# Patient Record
Sex: MALE | Race: WHITE | NOT HISPANIC OR LATINO | URBAN - METROPOLITAN AREA
[De-identification: names, ages, dates, MRNs, and addresses within clinical notes are randomized per-mention and may not be internally consistent; named-entity substitution may affect disease eponyms.]

---

## 2020-12-12 ENCOUNTER — INPATIENT (INPATIENT)
Facility: HOSPITAL | Age: 55
LOS: 1 days | Discharge: HOME | End: 2020-12-14
Attending: INTERNAL MEDICINE | Admitting: INTERNAL MEDICINE
Payer: COMMERCIAL

## 2020-12-12 VITALS
TEMPERATURE: 99 F | HEART RATE: 76 BPM | DIASTOLIC BLOOD PRESSURE: 76 MMHG | SYSTOLIC BLOOD PRESSURE: 135 MMHG | HEIGHT: 65 IN | RESPIRATION RATE: 16 BRPM | WEIGHT: 179.9 LBS | OXYGEN SATURATION: 98 %

## 2020-12-12 DIAGNOSIS — Z96.642 PRESENCE OF LEFT ARTIFICIAL HIP JOINT: Chronic | ICD-10-CM

## 2020-12-12 DIAGNOSIS — Z86.69 PERSONAL HISTORY OF OTHER DISEASES OF THE NERVOUS SYSTEM AND SENSE ORGANS: Chronic | ICD-10-CM

## 2020-12-12 DIAGNOSIS — Z98.89 OTHER SPECIFIED POSTPROCEDURAL STATES: Chronic | ICD-10-CM

## 2020-12-12 LAB
A1C WITH ESTIMATED AVERAGE GLUCOSE RESULT: 5.3 % — SIGNIFICANT CHANGE UP (ref 4–5.6)
ALBUMIN SERPL ELPH-MCNC: 4 G/DL — SIGNIFICANT CHANGE UP (ref 3.3–5)
ALP SERPL-CCNC: 56 U/L — SIGNIFICANT CHANGE UP (ref 40–120)
ALT FLD-CCNC: 15 U/L — SIGNIFICANT CHANGE UP (ref 4–41)
ANION GAP SERPL CALC-SCNC: 8 MMOL/L — SIGNIFICANT CHANGE UP (ref 7–14)
AST SERPL-CCNC: 17 U/L — SIGNIFICANT CHANGE UP (ref 4–40)
BASOPHILS # BLD AUTO: 0.05 K/UL — SIGNIFICANT CHANGE UP (ref 0–0.2)
BASOPHILS NFR BLD AUTO: 0.9 % — SIGNIFICANT CHANGE UP (ref 0–2)
BILIRUB SERPL-MCNC: 0.2 MG/DL — SIGNIFICANT CHANGE UP (ref 0.2–1.2)
BUN SERPL-MCNC: 18 MG/DL — SIGNIFICANT CHANGE UP (ref 7–23)
CALCIUM SERPL-MCNC: 8.8 MG/DL — SIGNIFICANT CHANGE UP (ref 8.4–10.5)
CHLORIDE SERPL-SCNC: 107 MMOL/L — SIGNIFICANT CHANGE UP (ref 98–107)
CO2 SERPL-SCNC: 26 MMOL/L — SIGNIFICANT CHANGE UP (ref 22–31)
CREAT SERPL-MCNC: 0.69 MG/DL — SIGNIFICANT CHANGE UP (ref 0.5–1.3)
EOSINOPHIL # BLD AUTO: 0.31 K/UL — SIGNIFICANT CHANGE UP (ref 0–0.5)
EOSINOPHIL NFR BLD AUTO: 5.3 % — SIGNIFICANT CHANGE UP (ref 0–6)
ESTIMATED AVERAGE GLUCOSE: 105 MG/DL — SIGNIFICANT CHANGE UP (ref 68–114)
GLUCOSE SERPL-MCNC: 88 MG/DL — SIGNIFICANT CHANGE UP (ref 70–99)
HCT VFR BLD CALC: 42.6 % — SIGNIFICANT CHANGE UP (ref 39–50)
HGB BLD-MCNC: 13.7 G/DL — SIGNIFICANT CHANGE UP (ref 13–17)
IANC: 3.13 K/UL — SIGNIFICANT CHANGE UP (ref 1.5–8.5)
IMM GRANULOCYTES NFR BLD AUTO: 0.3 % — SIGNIFICANT CHANGE UP (ref 0–1.5)
LYMPHOCYTES # BLD AUTO: 1.85 K/UL — SIGNIFICANT CHANGE UP (ref 1–3.3)
LYMPHOCYTES # BLD AUTO: 31.7 % — SIGNIFICANT CHANGE UP (ref 13–44)
MCHC RBC-ENTMCNC: 31.1 PG — SIGNIFICANT CHANGE UP (ref 27–34)
MCHC RBC-ENTMCNC: 32.2 GM/DL — SIGNIFICANT CHANGE UP (ref 32–36)
MCV RBC AUTO: 96.8 FL — SIGNIFICANT CHANGE UP (ref 80–100)
MONOCYTES # BLD AUTO: 0.47 K/UL — SIGNIFICANT CHANGE UP (ref 0–0.9)
MONOCYTES NFR BLD AUTO: 8.1 % — SIGNIFICANT CHANGE UP (ref 2–14)
NEUTROPHILS # BLD AUTO: 3.13 K/UL — SIGNIFICANT CHANGE UP (ref 1.8–7.4)
NEUTROPHILS NFR BLD AUTO: 53.7 % — SIGNIFICANT CHANGE UP (ref 43–77)
NRBC # BLD: 0 /100 WBCS — SIGNIFICANT CHANGE UP
NRBC # FLD: 0 K/UL — SIGNIFICANT CHANGE UP
PLATELET # BLD AUTO: 199 K/UL — SIGNIFICANT CHANGE UP (ref 150–400)
POTASSIUM SERPL-MCNC: 4.1 MMOL/L — SIGNIFICANT CHANGE UP (ref 3.5–5.3)
POTASSIUM SERPL-SCNC: 4.1 MMOL/L — SIGNIFICANT CHANGE UP (ref 3.5–5.3)
PROT SERPL-MCNC: 6.6 G/DL — SIGNIFICANT CHANGE UP (ref 6–8.3)
RBC # BLD: 4.4 M/UL — SIGNIFICANT CHANGE UP (ref 4.2–5.8)
RBC # FLD: 13.1 % — SIGNIFICANT CHANGE UP (ref 10.3–14.5)
SODIUM SERPL-SCNC: 141 MMOL/L — SIGNIFICANT CHANGE UP (ref 135–145)
TROPONIN T, HIGH SENSITIVITY RESULT: 8 NG/L — SIGNIFICANT CHANGE UP
WBC # BLD: 5.83 K/UL — SIGNIFICANT CHANGE UP (ref 3.8–10.5)
WBC # FLD AUTO: 5.83 K/UL — SIGNIFICANT CHANGE UP (ref 3.8–10.5)

## 2020-12-12 PROCEDURE — 93010 ELECTROCARDIOGRAM REPORT: CPT

## 2020-12-12 PROCEDURE — 71046 X-RAY EXAM CHEST 2 VIEWS: CPT | Mod: 26

## 2020-12-12 PROCEDURE — 99220: CPT

## 2020-12-12 NOTE — ED CDU PROVIDER INITIAL DAY NOTE - OBJECTIVE STATEMENT
54yo M no sig pmhx pw chest pain x one day. Started earlier today after lifting water bottles. pain located on the left chest, non radiating, exertional, non pleuritic. Resolved, took a nap, went to walk the dog felt pain again came to ED. Currently denies chest pain. no associated n/v/palpitations. +smoker +family hx of CAD (brother) Pt never had  a prior stress or echo test. Works out regularly never had pain like this. Took an aspirin earlier    CDU PETE Bender: Agree with above. 54yo M no sig pmhx pw chest pain x one day. Started earlier today after lifting water bottles. pain located on the left chest, non radiating, exertional, non pleuritic. Resolved, took a nap, went to walk the dog felt pain again came to ED. Currently denies chest pain. no associated n/v/palpitations. +smoker +family hx of CAD (brother) Pt never had  a prior stress or echo test. Works out regularly never had pain like this. Took an aspirin earlier    CDU PETE Bender: Agree with above. 54yo M no sig pmhx pw chest pain x one day. Started earlier today after lifting water bottles. pain located on the left chest, non radiating, exertional, non pleuritic. Pt states he was lifting water bottles earlier that day. States the pain will be 6/10, states it lasts for around 6 minutes. Pt states it has recurred multiple times. Pt states he does not have any pain at the moment. Pt states he does not have a Cardiologist. Pt denies any other sx or acute complaints. Plan is CDU for overnight telemtry, AM stress test.

## 2020-12-12 NOTE — ED CDU PROVIDER INITIAL DAY NOTE - ATTENDING CONTRIBUTION TO CARE
Attending Statement: I have reviewed and agree with all pertinent clinical information, including history and physical exam and agree with treatment plan of the PA, except as noted.  56yo M no sig pmhx pw chest pain x one day. Started earlier today after lifting water bottles. pain located on the left chest, non radiating, exertional, non pleuritic. Resolved, took a nap, went to walk the dog felt pain again came to ED. Currently denies chest pain. no associated n/v/palpitations. +smoker +family hx of CAD (brother) Pt never had  a prior stress or echo test. Works out regularly never had pain like this. Took an aspirin earlier  Vital signs noted. mmm. normal S1-S2 No resp distress. able to speak in full and clear sentences. no wheeze, rales or stridor. soft nontender abdomen. no  rebound. no guarding. no sign of trauma. no CVAT no pedal edema. no calf tenderness. normal pulses bilateral feet.  plan tele monitor, stress and echo in am

## 2020-12-12 NOTE — ED CDU PROVIDER INITIAL DAY NOTE - MEDICAL DECISION MAKING DETAILS
56yo M no sig pmhx pw chest pain x one day. Started earlier today after lifting water bottles. pain located on the left chest, non radiating, exertional, non pleuritic. Resolved, took a nap, went to walk the dog felt pain again came to ED. EKG is normal, trop is 8 and 8. Plan is overnight telemetry and an AM stress test. Pt states he has never seen a cardiologist or had testing before. Pt's brother has a H/O MI.

## 2020-12-12 NOTE — ED ADULT TRIAGE NOTE - CHIEF COMPLAINT QUOTE
c/o intermittent non-radiating sharp pains to left upper chest since this afternoon. Denies any cough, fever/chills, N/V, dizziness or SOB. Hx Bilateral hip replacements. c/o intermittent non-radiating sharp pains to left upper chest since this afternoon while sitting. Pt does recall lifting 2 cases of water today but states he actively exercises daily. Denies any cough, fever/chills, N/V, dizziness or SOB. Hx Bilateral hip replacements.

## 2020-12-12 NOTE — ED ADULT NURSE NOTE - CHIEF COMPLAINT QUOTE
c/o intermittent non-radiating sharp pains to left upper chest since this afternoon while sitting. Pt does recall lifting 2 cases of water today but states he actively exercises daily. Denies any cough, fever/chills, N/V, dizziness or SOB. Hx Bilateral hip replacements.

## 2020-12-12 NOTE — ED ADULT NURSE NOTE - NSIMPLEMENTINTERV_GEN_ALL_ED
Implemented All Universal Safety Interventions:  Dresher to call system. Call bell, personal items and telephone within reach. Instruct patient to call for assistance. Room bathroom lighting operational. Non-slip footwear when patient is off stretcher. Physically safe environment: no spills, clutter or unnecessary equipment. Stretcher in lowest position, wheels locked, appropriate side rails in place.

## 2020-12-12 NOTE — ED PROVIDER NOTE - OBJECTIVE STATEMENT
Dr Pardo  54yo M no sig pmhx pw chest pain x one day. Started earlier today after lifting water bottles. pain located on the left chest, non radiating, exertional, non pleuritic. Resolved, took a nap, went to walk the dog felt pain again came to ED. Currently denies chest pain. no associated n/v/palpitations. +smoker +family hx of CAD (brother) Pt never had  a prior stress or echo test. Works out regularly never had pain like this. Took an aspirin earlier

## 2020-12-12 NOTE — ED PROVIDER NOTE - CLINICAL SUMMARY MEDICAL DECISION MAKING FREE TEXT BOX
plan ekg, labs, cxr, covid,cdu plan ekg, labs, cxr, covid,cdu    PETE Bender: Agree with above, 54 Y/O M denies PMH C/O CP x multiple episodes at rest. Pt endorses active lifestyle but has never had a cardiac workup and brother had a H.O MI. EKG NSR. WIll CDU for stress and overnight telemetry.

## 2020-12-12 NOTE — ED PROVIDER NOTE - PHYSICAL EXAMINATION
Dr Pardo  nontoxic male. mmm  normal S1-S2  No resp distress. able to speak in full and clear sentences. no wheeze, rales or stridor.  soft nontender abdomen. no  rebound. no guarding. no sign of trauma. no CVAT   ambulatory no distress

## 2020-12-12 NOTE — ED ADULT NURSE NOTE - OBJECTIVE STATEMENT
55 yr old male pt presents to ED for evaluation of chest pain. pt states his pain started x today after lifting cases of water while shopping. pt describes exertional non radiating pain to left chest whish resolved shortly after onset. pt axox4, in nad, steady gait, denies any current cp, sob, n/v/d headache, dizziness. pt evaluated by provider, labwork collected, iv placed 20 g to left ac, awaiting lab results and disposition.

## 2020-12-12 NOTE — ED PROVIDER NOTE - FAMILY HISTORY
Mother  Still living? Yes, Estimated age: Age Unknown  Family history of bladder cancer, Age at diagnosis: Age Unknown     Sibling  Still living? Yes, Estimated age: Age Unknown  Family history of breast cancer, Age at diagnosis: Age Unknown  Family history of MI (myocardial infarction), Age at diagnosis: Age Unknown

## 2020-12-12 NOTE — ED PROVIDER NOTE - ATTENDING CONTRIBUTION TO CARE
Attending Statement: I have reviewed and agree with all pertinent clinical information, including history and physical exam and agree with treatment plan of the PA, except as noted.  see HPI/pe

## 2020-12-13 DIAGNOSIS — R94.39 ABNORMAL RESULT OF OTHER CARDIOVASCULAR FUNCTION STUDY: ICD-10-CM

## 2020-12-13 LAB
SARS-COV-2 RNA SPEC QL NAA+PROBE: SIGNIFICANT CHANGE UP
TROPONIN T, HIGH SENSITIVITY RESULT: 8 NG/L — SIGNIFICANT CHANGE UP

## 2020-12-13 PROCEDURE — 99223 1ST HOSP IP/OBS HIGH 75: CPT

## 2020-12-13 PROCEDURE — 78452 HT MUSCLE IMAGE SPECT MULT: CPT | Mod: 26

## 2020-12-13 PROCEDURE — 99217: CPT

## 2020-12-13 PROCEDURE — 93018 CV STRESS TEST I&R ONLY: CPT | Mod: GC

## 2020-12-13 PROCEDURE — 93016 CV STRESS TEST SUPVJ ONLY: CPT | Mod: GC

## 2020-12-13 RX ORDER — ASPIRIN/CALCIUM CARB/MAGNESIUM 324 MG
162 TABLET ORAL ONCE
Refills: 0 | Status: COMPLETED | OUTPATIENT
Start: 2020-12-13 | End: 2020-12-14

## 2020-12-13 NOTE — H&P ADULT - NSHPREVIEWOFSYSTEMS_GEN_ALL_CORE
Review of Systems:   CONSTITUTIONAL: No fever, weight loss, or fatigue  EYES: No eye pain, visual disturbances, or discharge  ENMT:  No difficulty hearing, tinnitus, vertigo; No sinus or throat pain  NECK: No pain or stiffness  RESPIRATORY: No cough, wheezing, chills or hemoptysis; No shortness of breath  CARDIOVASCULAR: No active chest pain, palpitations, dizziness, or leg swelling  GASTROINTESTINAL: No abdominal or epigastric pain. No nausea, vomiting, or hematemesis; No diarrhea or constipation. No melena or hematochezia.  GENITOURINARY: No dysuria, frequency, hematuria, or incontinence  NEUROLOGICAL: No headaches, memory loss, loss of strength, numbness, or tremors  SKIN: No itching, burning, rashes, or lesions   LYMPH NODES: No enlarged glands  ENDOCRINE: No heat or cold intolerance; No hair loss  MUSCULOSKELETAL: No joint pain or swelling; No muscle, back, or extremity pain

## 2020-12-13 NOTE — CONSULT NOTE ADULT - SUBJECTIVE AND OBJECTIVE BOX
DATE OF SERVICE: 12/13/2020   Patient was seen,examined and evaluated  by me.    CHIEF COMPLAINT:Chest Pain    HPI: 56yo M no sig pmhx pw chest pain x one day after lifting water bottles. pain located on the left chest, non radiating, exertional, non pleuritic  Works out regularly never had pain like this.      PAST MEDICAL & SURGICAL HISTORY:  OA (osteoarthritis)    H/O colonoscopy    History of cataract  bilat 2003    Status post total hip replacement, left  2007        MEDICATIONS  (STANDING):    MEDICATIONS  (PRN):      FAMILY HISTORY:  Family history of MI (myocardial infarction) (Sibling)  Brother    Family history of breast cancer (Sibling)    Family history of bladder cancer (Mother)      No family history of premature coronary artery disease or sudden cardiac death    SOCIAL HISTORY:  Smoking-[x ] Active  [ ] Former [ ] Non Smoker  Alcohol-[ ] Denies [x ] Social [ ] Daily  Ilicit Drug use-[x ] Denies [ ] Active user    REVIEW OF SYSTEMS:  Constitutional: [ ] fever, [ ]weight loss, [ ]fatigue   Activity [ ] Bedbound,[ ] Ambulates [ ] Unassisted[ ] Cane/Walker [ ] Assistence.  Effort tolerance:[ ] Excellent [ ] Good [ ] Fair [ ] Poor [ ]  Eyes: [ ] visual changes  Respiratory: [ ]shortness of breath;  [ ] cough, [ ]wheezing, [ ]chills, [ ]hemoptysis  Cardiovascular: [x ] chest pain, [ ]palpitations, [ ]dizziness,  [ ]leg swelling[ ]orthopnea [ ]PND  Gastrointestinal: [ ] abdominal pain, [ ]nausea, [ ]vomiting,  [ ]diarrhea,[ ]constipation  Genitourinary: [ ] dysuria, [ ] hematuria  Neurologic: [ ] headaches [ ] tremors[ ] weakness  Skin: [ ] itching, [ ]burning, [ ] rashes  Endocrine: [ ] heat or cold intolerance  Musculoskeletal: [ ] joint pain or swelling; [ ] muscle, back, or extremity pain  Psychiatric: [ ] depression, [ ]anxiety, [ ]mood swings, or [ ]difficulty sleeping  Hematologic: [ ] easy bruising, [ ] bleeding gums       [ x] All others negative	  [ ] Unable to obtain    Vital Signs Last 24 Hrs  T(C): 36.9 (13 Dec 2020 05:48), Max: 37.1 (12 Dec 2020 19:00)  T(F): 98.4 (13 Dec 2020 05:48), Max: 98.7 (12 Dec 2020 19:00)  HR: 65 (13 Dec 2020 05:48) (60 - 76)  BP: 130/87 (13 Dec 2020 05:48) (130/87 - 145/91)  RR: 16 (13 Dec 2020 05:48) (16 - 16)  SpO2: 98% (13 Dec 2020 05:48) (98% - 100%)  I&O's Summary      PHYSICAL EXAM:  General: No acute distress BMI-31  HEENT: EOMI, PERRL[ ] Icteric  Neck: Supple, No JVD  Lungs: Equal air entry bilaterally; [ ] Rales [ ] Rhonchi [ ] Wheezing  Heart: Regular rate and rhythm;[x ] Murmurs-  2 /6 [x ] Systolic [ ] Diastolic [ ] Radiation,No rubs, or gallops  Abdomen: Nontender, bowel sounds present  Extremities: No clubbing, cyanosis, or edema[ ] Calf tenderness  Nervous system:  Alert & Oriented X3, no focal deficits  Psychiatric: Normal affect  Skin: No rashes or lesions      LABS:  12-12    141  |  107  |  18  ----------------------------<  88  4.1   |  26  |  0.69    Ca    8.8      12 Dec 2020 22:38    TPro  6.6  /  Alb  4.0  /  TBili  0.2  /  DBili  x   /  AST  17  /  ALT  15  /  AlkPhos  56  12-12    Creatinine Trend: 0.69<--                        13.7   5.83  )-----------( 199      ( 12 Dec 2020 22:38 )             42.6     Troponin T, High Sensitivity Result: 8 <--8 ng/L      RADIOLOGY:  XR CHEST PA LAT 2V    FINDINGS:  Clear lungs.  The heart is normal in size.  The bones demonstrate no acute abnormality.    IMPRESSION:  Clear lungs.      ECG [my interpretation]:Sinus rhythm at 76 BPM Normal intervals no acute ST T wave abnormalities    TELEMETRY:Sinus rhythm    STRESS TEST:Pending

## 2020-12-13 NOTE — H&P ADULT - NSICDXFAMILYHX_GEN_ALL_CORE_FT
FAMILY HISTORY:  Mother  Still living? Yes, Estimated age: Age Unknown  Family history of bladder cancer, Age at diagnosis: Age Unknown    Sibling  Still living? Yes, Estimated age: Age Unknown  Family history of breast cancer, Age at diagnosis: Age Unknown  Family history of MI (myocardial infarction), Age at diagnosis: Age Unknown

## 2020-12-13 NOTE — CONSULT NOTE ADULT - ASSESSMENT
54yo M no sig pmhx pw chest pain x one day. Family Hx CAD active smoker no evidence for cardiac injury    #CHEST PAIN concern for ACS  -No evidence for recent cardiac injury  -Troponins negative  -Had Nuclear Stress Test-if non ischemic stable to discharge    Active smoker adviced smoking cessation offered Nicotine patch

## 2020-12-13 NOTE — H&P ADULT - NSHPPHYSICALEXAM_GEN_ALL_CORE
PHYSICAL EXAM:      Constitutional: NAD, well-groomed, well-developed  HEENT:  EOMI, Normal Hearing  Neck: No LAD, No JVD  Back: Normal spine flexure, No CVA tenderness  Respiratory: CTAB  Cardiovascular: S1 and S2, RRR, no M/G/R  Gastrointestinal: BS+, soft, NT/ND  Extremities: No peripheral edema  Vascular: 2+ peripheral pulses  Neurological: A/O x 3, no focal deficits  Psychiatric: Normal mood, normal affect  Musculoskeletal: 5/5 strength b/l upper and lower extremities  Skin: No rashes

## 2020-12-13 NOTE — ED CDU PROVIDER SUBSEQUENT DAY NOTE - ATTENDING CONTRIBUTION TO CARE
evaluated patient on am rounds  No complaints, feeling well, no cp at this time, seen by cardiology this am.   on exam  A & O x 3, NAD, HEENT WNL and no facial asymmetry; lungs CTAB, heart with reg rhythm without murmur; abdomen soft NTND; extremities with no edema; neuro exam non focal with no motor or sensory deficits.  plan to continue tele monitoring, f/u stress results, if ok likely dc.

## 2020-12-13 NOTE — H&P ADULT - HISTORY OF PRESENT ILLNESS
54yo M no sig pmhx p/w chest pain x one day after lifting water bottles. pain located on the left chest, non radiating, exertional, non pleuritic  Works out regularly never had pain like this. ekg, HS trop unremarkable, but NST demonstrated moderate to severe  defect at inferior wall; ef 45%

## 2020-12-13 NOTE — ED CDU PROVIDER DISPOSITION NOTE - ATTENDING CONTRIBUTION TO CARE
56yo M denies PMH former smoker C/O chest pain x one day. Pain was intermittent, L sided and occurred in multiple episodes. Pt had normal enzymes and a normal EKG. Pt was sent to CDU for telemetry monitoring and a stress test. Pt was found to have an abnormal stress, spoke with Dr. Schaefer, planning to admit for AM Angiogram. Pt has no CP, Heparin or other A/C aside from ASA not needed at this time as per Dr. Schaefer.    Gen: Well appearing in NAD  Head: NC/AT  Neck: trachea midline  Resp:  No distress  Ext: no deformities  Neuro:  A&O appears non focal  Skin:  Warm and dry as visualized  Psych:  Normal affect and mood

## 2020-12-13 NOTE — H&P ADULT - NSHPLABSRESULTS_GEN_ALL_CORE
13.7   5.83  )-----------( 199      ( 12 Dec 2020 22:38 )             42.6     12-12    141  |  107  |  18  ----------------------------<  88  4.1   |  26  |  0.69    Ca    8.8      12 Dec 2020 22:38    TPro  6.6  /  Alb  4.0  /  TBili  0.2  /  DBili  x   /  AST  17  /  ALT  15  /  AlkPhos  56  12-12    CAPILLARY BLOOD GLUCOSE            Vital Signs Last 24 Hrs  T(C): 36.4 (14 Dec 2020 02:35), Max: 36.9 (13 Dec 2020 05:48)  T(F): 97.5 (14 Dec 2020 02:35), Max: 98.4 (13 Dec 2020 05:48)  HR: 62 (14 Dec 2020 02:35) (61 - 66)  BP: 133/84 (14 Dec 2020 02:35) (113/82 - 144/99)  BP(mean): --  RR: 18 (14 Dec 2020 02:35) (14 - 18)  SpO2: 96% (14 Dec 2020 02:35) (96% - 98%)

## 2020-12-13 NOTE — ED CDU PROVIDER SUBSEQUENT DAY NOTE - PROGRESS NOTE DETAILS
Pt is resting comfortably, no acute complaints/events on telemetry. Received signout on pt, pt reassessed and has no active CP. Received call from MD Castellon, advised stress is indeed abnormal and that prior plan for resting images is not needed. Spoke to Dr. Jigar Schaefer, states only ASA is needed for AC, planning for cath tomorrow. Plan explained to pt who verbalized understanding.

## 2020-12-13 NOTE — ED CDU PROVIDER DISPOSITION NOTE - CLINICAL COURSE
54yo M denies PMH former smoker C/O chest pain x one day. Pain was intermittent, L sided and occurred in multiple episodes. Pt had normal enzymes and a normal EKG. Pt was sent to CDU for telemetry monitoring and a stress test. Pt was found to have an abnormal stress, spoke with Dr. Schaefer, planning to admit for AM Angiogram. Pt has no CP, Heparin or other A/C aside from ASA not needed at this time as per Dr. Schaefer.

## 2020-12-13 NOTE — ED CDU PROVIDER SUBSEQUENT DAY NOTE - HISTORY
55 y.o male smoker presented to the ED with chest pain- sent to cdu for tele, stress. Pt offers no complaints this morning, denies any chest pain, sob. Will continue to monitor on tele.

## 2020-12-13 NOTE — H&P ADULT - NSICDXPASTSURGICALHX_GEN_ALL_CORE_FT
Scribe Attestation (For Scribes USE Only)... PAST SURGICAL HISTORY:  H/O colonoscopy     History of cataract bilat 2003    Status post total hip replacement, left 2007     Attending Attestation (For Attendings USE Only).../Scribe Attestation (For Scribes USE Only)...

## 2020-12-13 NOTE — CONSULT NOTE ADULT - ATTENDING COMMENTS
Patient was seen and examined by me on 12/13/2020,interim events noted,labs and radiology studies reviewed.  Jigar Schaefer MD,FACC.  [address] [place] [address].  [id number].  [phone]

## 2020-12-14 ENCOUNTER — TRANSCRIPTION ENCOUNTER (OUTPATIENT)
Age: 55
End: 2020-12-14

## 2020-12-14 VITALS
HEART RATE: 64 BPM | DIASTOLIC BLOOD PRESSURE: 83 MMHG | SYSTOLIC BLOOD PRESSURE: 135 MMHG | RESPIRATION RATE: 18 BRPM | OXYGEN SATURATION: 98 % | TEMPERATURE: 98 F

## 2020-12-14 DIAGNOSIS — R94.39 ABNORMAL RESULT OF OTHER CARDIOVASCULAR FUNCTION STUDY: ICD-10-CM

## 2020-12-14 LAB
APTT BLD: 31.9 SEC — SIGNIFICANT CHANGE UP (ref 27–36.3)
CHOLEST SERPL-MCNC: 163 MG/DL — SIGNIFICANT CHANGE UP
HDLC SERPL-MCNC: 53 MG/DL — SIGNIFICANT CHANGE UP
INR BLD: 1.05 RATIO — SIGNIFICANT CHANGE UP (ref 0.88–1.17)
LIPID PNL WITH DIRECT LDL SERPL: 95 MG/DL — SIGNIFICANT CHANGE UP
NON HDL CHOLESTEROL: 110 MG/DL — SIGNIFICANT CHANGE UP
PROTHROM AB SERPL-ACNC: 12.1 SEC — SIGNIFICANT CHANGE UP (ref 9.8–13.1)
TRIGL SERPL-MCNC: 74 MG/DL — SIGNIFICANT CHANGE UP

## 2020-12-14 PROCEDURE — 99152 MOD SED SAME PHYS/QHP 5/>YRS: CPT

## 2020-12-14 PROCEDURE — 93454 CORONARY ARTERY ANGIO S&I: CPT | Mod: 26

## 2020-12-14 RX ORDER — HEPARIN SODIUM 5000 [USP'U]/ML
5000 INJECTION INTRAVENOUS; SUBCUTANEOUS EVERY 8 HOURS
Refills: 0 | Status: DISCONTINUED | OUTPATIENT
Start: 2020-12-14 | End: 2020-12-14

## 2020-12-14 RX ORDER — INFLUENZA VIRUS VACCINE 15; 15; 15; 15 UG/.5ML; UG/.5ML; UG/.5ML; UG/.5ML
0.5 SUSPENSION INTRAMUSCULAR ONCE
Refills: 0 | Status: DISCONTINUED | OUTPATIENT
Start: 2020-12-14 | End: 2020-12-14

## 2020-12-14 RX ADMIN — HEPARIN SODIUM 5000 UNIT(S): 5000 INJECTION INTRAVENOUS; SUBCUTANEOUS at 06:08

## 2020-12-14 RX ADMIN — Medication 162 MILLIGRAM(S): at 08:05

## 2020-12-14 NOTE — DISCHARGE NOTE PROVIDER - CARE PROVIDER_API CALL
Jigar Schaefer)  Cardiology  [address] [id number]  Phone: [phone]  Fax: [phone]  Follow Up Time:

## 2020-12-14 NOTE — PROGRESS NOTE ADULT - ATTENDING COMMENTS
Patient was seen and examined by me on 12/14/2020,interim events noted,labs and radiology studies reviewed.  Jigar Schaefer MD,FACC.  [address] [place] [address].  [id number].  [phone]

## 2020-12-14 NOTE — PROGRESS NOTE ADULT - SUBJECTIVE AND OBJECTIVE BOX
DATE OF SERVICE:12/14/2020  Patient was seen and examined ,interim events noted.Consultant notes ,Labs,Telemetry reviewed by me    PRESENTING CC:Chest pain    HPI and HOSPITAL COURSE: HPI:  56yo M no sig pmhx p/w chest pain x one day after lifting water bottles. pain located on the left chest, non radiating, exertional, non pleuritic  Works out regularly never had pain like this. ekg, HS trop unremarkable, but NST demonstrated moderate to severe  defect at inferior wall; EF 45%       INTERIM EVENTS:Awake alert no chest pain      PMH -reviewed admission note, no change since admission  Heart Failure: Acute [ ] Chronic [ ] Acute on Chronic [ ] Diastolic [ ] Systolic [ ] Combined Systolic and Diastolic[ ]  [place][ ]  ATN[ ]  CKD I [ ] CKDII [ ] CKD III [ ] CKD IV [ ] CKD V [ ] ESRD[ ]  HTN[ ] CVA[ ] DM[ ] COPD[ ] COVID[ ] AF[ ]  PPM[ ] ICD[ ]    MEDICATIONS  (STANDING):  aspirin enteric coated 162 milliGRAM(s) Oral once  heparin   Injectable 5000 Unit(s) SubCutaneous every 8 hours  influenza   Vaccine 0.5 milliLiter(s) IntraMuscular once      REVIEW OF SYSTEMS:  Constitutional: [ ] fever, [ ]weight loss,  [ ]fatigue  Eyes: [ ] visual changes  Respiratory: [ ]shortness of breath;  [ ] cough, [ ]wheezing, [ ]chills, [ ]hemoptysis  Cardiovascular: [ ] chest pain, [ ]palpitations, [ ]dizziness,  [ ]leg swelling[ ]orthopnea[ ]PND  Gastrointestinal: [ ] abdominal pain, [ ]nausea, [ ]vomiting,  [ ]diarrhea [ ]Constipation [ ]Melena  Genitourinary: [ ] dysuria, [ ] hematuria [ ][place]  Neurologic: [ ] headaches [ ] tremors[ ]weakness [ ]Paralysis Right[ ] Left[ ]  Skin: [ ] itching, [ ]burning, [ ] rashes  Endocrine: [ ] heat or cold intolerance  Musculoskeletal: [ ] joint pain or swelling; [ ] muscle, back, or extremity pain  Psychiatric: [ ] depression, [ ]anxiety, [ ]mood swings, or [ ]difficulty sleeping  Hematologic: [ ] easy bruising, [ ] bleeding gums    [x] All remaining systems negative except as per above.   [ ]Unable to obtain.    Vital Signs Last 24 Hrs  T(C): 36.4 (14 Dec 2020 02:35), Max: 36.6 (13 Dec 2020 14:57)  T(F): 97.5 (14 Dec 2020 02:35), Max: 97.9 (13 Dec 2020 14:57)  HR: 62 (14 Dec 2020 02:35) (61 - 66)  BP: 133/84 (14 Dec 2020 02:35) (113/82 - 144/99)  RR: 18 (14 Dec 2020 02:35) (14 - 18)  SpO2: 96% (14 Dec 2020 02:35) (96% - 98%)  I&O's Summary      PHYSICAL EXAM:  General: No acute distress BMI-26  HEENT: EOMI, PERRL  Neck: Supple, [ ] JVD  Lungs: Equal air entry bilaterally; [ ] rales [ ] wheezing [ ] rhonchi  Heart: Regular rate and rhythm; [x ] murmur 2 /6 [x ] systolic [ ] diastolic [ ] radiation[ ] rubs [ ]  gallops  Abdomen: Nontender, bowel sounds present  Extremities: No clubbing, cyanosis, [ ] edema [ ]Pulses  equal and intact  Nervous system:  Alert & Oriented X3, no focal deficits  Psychiatric: Normal affect  Skin: No rashes or lesions    LABS:  12-12    141  |  107  |  18  ----------------------------<  88  4.1   |  26  |  0.69    Ca    8.8      12 Dec 2020 22:38    TPro  6.6  /  Alb  4.0  /  TBili  0.2  /  DBili  x   /  AST  17  /  ALT  15  /  AlkPhos  56  12-12    Creatinine Trend: 0.69<--                        13.7   5.83  )-----------( 199      ( 12 Dec 2020 22:38 )             42.6           STRESS TEST:STUDY DATE: 12/13/2020  IMPRESSIONS:Abnormal Study  * There is a medium sized, moderate to severe defect in inferior wall that is predominantly fixed consistent with infarction with minimal zahra-infarct ischemia.  * Post-stress gated wall motion analysis was performed (LVEF = 45 %;LVEDV = 125 ml.), revealing hypokinesis in the inferior wall.        IMPRESSION AND PLAN:      56yo M no sig pmhx pw chest pain x one day. Family Hx CAD active smoker no evidence for cardiac injury    #CHEST PAIN concern for ACS  -No evidence for recent cardiac injury  -Troponins negative  -Had Nuclear Stress Test-Fixed inferior defect EF 45% with inferior wall hypokinesia  -For cardiac cath today      Active smoker adviced smoking cessation offered Nicotine patch

## 2020-12-14 NOTE — CONSULT NOTE ADULT - SUBJECTIVE AND OBJECTIVE BOX
Reason for Admission: chest pain  History of Present Illness:   54yo M no sig pmhx p/w chest pain x one day after lifting water bottles. pain located on the left chest, non radiating, exertional, non pleuritic  Works out regularly never had pain like this. ekg, HS trop unremarkable, but NST demonstrated moderate to severe  defect at inferior wall; ef 45%       Review of Systems:  Review of Systems: Review of Systems:   CONSTITUTIONAL: No fever, weight loss, or fatigue  EYES: No eye pain, visual disturbances, or discharge  ENMT:  No difficulty hearing, tinnitus, vertigo; No sinus or throat pain  NECK: No pain or stiffness  RESPIRATORY: No cough, wheezing, chills or hemoptysis; No shortness of breath  CARDIOVASCULAR: No active chest pain, palpitations, dizziness, or leg swelling  GASTROINTESTINAL: No abdominal or epigastric pain. No nausea, vomiting, or hematemesis; No diarrhea or constipation. No melena or hematochezia.  GENITOURINARY: No dysuria, frequency, hematuria, or incontinence  NEUROLOGICAL: No headaches, memory loss, loss of strength, numbness, or tremors  SKIN: No itching, burning, rashes, or lesions   LYMPH NODES: No enlarged glands  ENDOCRINE: No heat or cold intolerance; No hair loss  MUSCULOSKELETAL: No joint pain or swelling; No muscle, back, or extremity pain      Allergies and Intolerances:        Allergies:  	No Known Allergies:     Home Medications:   * Patient Currently Takes Medications as of 16-Jan-2016 08:37 documented in Structured Notes  · 	aspirin 325 mg oral delayed release tablet: 1 tab(s) orally 2 times a day  · 	docusate sodium 100 mg oral capsule: 1 cap(s) orally 3 times a day  · 	traMADol 50 mg oral tablet: 1 tab(s) orally every 8 hours  · 	Percocet 5/325 oral tablet: 1-2 tab(s) orally every 4-6 hours as needed for pain    Patient History:   Past Medical, Past Surgical, and Family History:  PAST MEDICAL HISTORY:  OA (osteoarthritis).     PAST SURGICAL HISTORY:  H/O colonoscopy     History of cataract bilat 2003    Status post total hip replacement, left 2007.     FAMILY HISTORY:  Mother  Still living? Yes, Estimated age: Age Unknown  Family history of bladder cancer, Age at diagnosis: Age Unknown    Sibling  Still living? Yes, Estimated age: Age Unknown  Family history of breast cancer, Age at diagnosis: Age Unknown  Family history of MI (myocardial infarction), Age at diagnosis: Age Unknown.    Social History:  Social History (marital status, living situation, occupation, tobacco use, alcohol and drug use, and sexual history): smoked pack of ciggs every 2 days    Tobacco Screening:  · Core Measure Site	No    Physical Exam:   Physical Exam: PHYSICAL EXAM:      Constitutional: NAD, well-groomed, well-developed  HEENT:  EOMI, Normal Hearing  Neck: No LAD, No JVD  Back: Normal spine flexure, No CVA tenderness  Respiratory: CTAB  Cardiovascular: S1 and S2, RRR, no M/G/R  Gastrointestinal: BS+, soft, NT/ND  Extremities: No peripheral edema  Vascular: 2+ peripheral pulses  Neurological: A/O x 3, no focal deficits  Psychiatric: Normal mood, normal affect  Musculoskeletal: 5/5 strength b/l upper and lower extremities  Skin: No rashes      Labs and Results:  Labs, Radiology, Cardiology, and Other Results: 13.7   5.83  )-----------( 199      ( 12 Dec 2020 22:38 )             42.6     12-12    141  |  107  |  18  ----------------------------<  88  4.1   |  26  |  0.69    Ca    8.8      12 Dec 2020 22:38    TPro  6.6  /  Alb  4.0  /  TBili  0.2  /  DBili  x   /  AST  17  /  ALT  15  /  AlkPhos  56  12-12    CAPILLARY BLOOD GLUCOSE            Vital Signs Last 24 Hrs  T(C): 36.4 (14 Dec 2020 02:35), Max: 36.9 (13 Dec 2020 05:48)  T(F): 97.5 (14 Dec 2020 02:35), Max: 98.4 (13 Dec 2020 05:48)  HR: 62 (14 Dec 2020 02:35) (61 - 66)  BP: 133/84 (14 Dec 2020 02:35) (113/82 - 144/99)  BP(mean): --  RR: 18 (14 Dec 2020 02:35) (14 - 18)  SpO2: 96% (14 Dec 2020 02:35) (96% - 98%)    Assessment and Plan:   Assessment:  · Assessment	  54 yo m with + stress test    Reason for Admission: chest pain  History of Present Illness:   54yo M no sig pmhx p/w chest pain x one day after lifting water bottles. pain located on the left chest, non radiating, exertional, non pleuritic  Works out regularly never had pain like this. ekg, HS trop unremarkable, but NST demonstrated moderate to severe  defect at inferior wall; ef 45%       Review of Systems:  Review of Systems: Review of Systems:   CONSTITUTIONAL: No fever, weight loss, or fatigue  EYES: No eye pain, visual disturbances, or discharge  ENMT:  No difficulty hearing, tinnitus, vertigo; No sinus or throat pain  NECK: No pain or stiffness  RESPIRATORY: No cough, wheezing, chills or hemoptysis; No shortness of breath  CARDIOVASCULAR: No active chest pain, palpitations, dizziness, or leg swelling  GASTROINTESTINAL: No abdominal or epigastric pain. No nausea, vomiting, or hematemesis; No diarrhea or constipation. No melena or hematochezia.  GENITOURINARY: No dysuria, frequency, hematuria, or incontinence  NEUROLOGICAL: No headaches, memory loss, loss of strength, numbness, or tremors  SKIN: No itching, burning, rashes, or lesions   LYMPH NODES: No enlarged glands  ENDOCRINE: No heat or cold intolerance; No hair loss  MUSCULOSKELETAL: No joint pain or swelling; No muscle, back, or extremity pain      Allergies and Intolerances:        Allergies:  	No Known Allergies:     Home Medications:   * Patient Currently Takes Medications as of 16-Jan-2016 08:37 documented in Structured Notes  · 	aspirin 325 mg oral delayed release tablet: 1 tab(s) orally 2 times a day  · 	docusate sodium 100 mg oral capsule: 1 cap(s) orally 3 times a day  · 	traMADol 50 mg oral tablet: 1 tab(s) orally every 8 hours  · 	Percocet 5/325 oral tablet: 1-2 tab(s) orally every 4-6 hours as needed for pain    Patient History:   Past Medical, Past Surgical, and Family History:  PAST MEDICAL HISTORY:  OA (osteoarthritis).     PAST SURGICAL HISTORY:  H/O colonoscopy     History of cataract bilat 2003    Status post total hip replacement, left 2007.     FAMILY HISTORY:  Mother  Still living? Yes, Estimated age: Age Unknown  Family history of bladder cancer, Age at diagnosis: Age Unknown    Sibling  Still living? Yes, Estimated age: Age Unknown  Family history of breast cancer, Age at diagnosis: Age Unknown  Family history of MI (myocardial infarction), Age at diagnosis: Age Unknown.    Social History:  Social History (marital status, living situation, occupation, tobacco use, alcohol and drug use, and sexual history): smoked pack of ciggs every 2 days    Tobacco Screening:  · Core Measure Site	No    Physical Exam:   Physical Exam: PHYSICAL EXAM:      Constitutional: NAD, well-groomed, well-developed  HEENT:  EOMI, Normal Hearing  Neck: No LAD, No JVD  Back: Normal spine flexure, No CVA tenderness  Respiratory: CTAB  Cardiovascular: S1 and S2, RRR, no M/G/R    Gastrointestinal: BS+, soft, NT/ND  Extremities: No peripheral edema  Vascular: 2+ peripheral pulses  Neurological: A/O x 3, no focal deficits  Psychiatric: Normal mood, normal affect  Musculoskeletal: 5/5 strength b/l upper and lower extremities  Skin: No rashes      Labs and Results:  Labs, Radiology, Cardiology, and Other Results: 13.7   5.83  )-----------( 199      ( 12 Dec 2020 22:38 )             42.6     12-12    141  |  107  |  18  ----------------------------<  88  4.1   |  26  |  0.69    Ca    8.8      12 Dec 2020 22:38    TPro  6.6  /  Alb  4.0  /  TBili  0.2  /  DBili  x   /  AST  17  /  ALT  15  /  AlkPhos  56  12-12    CAPILLARY BLOOD GLUCOSE            Vital Signs Last 24 Hrs  T(C): 36.4 (14 Dec 2020 02:35), Max: 36.9 (13 Dec 2020 05:48)  T(F): 97.5 (14 Dec 2020 02:35), Max: 98.4 (13 Dec 2020 05:48)  HR: 62 (14 Dec 2020 02:35) (61 - 66)  BP: 133/84 (14 Dec 2020 02:35) (113/82 - 144/99)  BP(mean): --  RR: 18 (14 Dec 2020 02:35) (14 - 18)  SpO2: 96% (14 Dec 2020 02:35) (96% - 98%)    Assessment and Plan:   Assessment:  · Assessment	  54 yo m with + stress test      s/p cath   cleared for dc by cards

## 2020-12-14 NOTE — DISCHARGE NOTE NURSING/CASE MANAGEMENT/SOCIAL WORK - PATIENT PORTAL LINK FT
You can access the FollowMyHealth Patient Portal offered by [facility] by registering at the following website: [email or web address]. By joining [facility]’s FollowMyHealth portal, you will also be able to view your health information using other applications (apps) compatible with our system.

## 2020-12-14 NOTE — CHART NOTE - NSCHARTNOTEFT_GEN_A_CORE
pt s/p cath with luminal disease. discussed with DR. Schaefer.  Zack for d/c home today on baby aspirin.

## 2020-12-14 NOTE — DISCHARGE NOTE PROVIDER - HOSPITAL COURSE
54yo M no sig pmhx p/w chest pain x one day after lifting water bottles. pain located on the left chest, non radiating, exertional, non pleuritic  Works out regularly never had pain like this. ekg, HS trop unremarkable, but NST demonstrated moderate to severe  defect at inferior wall; ef 45%.    - 12/14: s/p LHC via RRA. Luminal irregularities. continue aspirin 81 mg daily.  stable for d/c home and f/u as out pt.

## 2022-04-21 ENCOUNTER — APPOINTMENT (OUTPATIENT)
Dept: INTERNAL MEDICINE | Facility: CLINIC | Age: 57
End: 2022-04-21
Payer: COMMERCIAL

## 2022-04-21 VITALS
WEIGHT: 183 LBS | HEART RATE: 76 BPM | BODY MASS INDEX: 32.43 KG/M2 | RESPIRATION RATE: 12 BRPM | DIASTOLIC BLOOD PRESSURE: 79 MMHG | SYSTOLIC BLOOD PRESSURE: 125 MMHG | HEIGHT: 63 IN | TEMPERATURE: 97.7 F | OXYGEN SATURATION: 97 %

## 2022-04-21 DIAGNOSIS — Z00.00 ENCOUNTER FOR GENERAL ADULT MEDICAL EXAMINATION W/OUT ABNORMAL FINDINGS: ICD-10-CM

## 2022-04-21 DIAGNOSIS — R25.2 CRAMP AND SPASM: ICD-10-CM

## 2022-04-21 DIAGNOSIS — R20.2 PARESTHESIA OF SKIN: ICD-10-CM

## 2022-04-21 DIAGNOSIS — M25.559 PAIN IN UNSPECIFIED HIP: ICD-10-CM

## 2022-04-21 DIAGNOSIS — R20.0 ANESTHESIA OF SKIN: ICD-10-CM

## 2022-04-21 DIAGNOSIS — Z23 ENCOUNTER FOR IMMUNIZATION: ICD-10-CM

## 2022-04-21 PROCEDURE — 99386 PREV VISIT NEW AGE 40-64: CPT

## 2022-04-21 PROCEDURE — 99213 OFFICE O/P EST LOW 20 MIN: CPT

## 2022-04-29 ENCOUNTER — APPOINTMENT (OUTPATIENT)
Dept: CARDIOLOGY | Facility: CLINIC | Age: 57
End: 2022-04-29
Payer: COMMERCIAL

## 2022-04-29 PROCEDURE — 93925 LOWER EXTREMITY STUDY: CPT

## 2022-04-29 RX ORDER — LIDOCAINE 5% 700 MG/1
5 PATCH TOPICAL
Qty: 30 | Refills: 0 | Status: ACTIVE | COMMUNITY
Start: 2022-04-25 | End: 1900-01-01

## 2022-05-03 ENCOUNTER — NON-APPOINTMENT (OUTPATIENT)
Age: 57
End: 2022-05-03

## 2022-05-06 ENCOUNTER — APPOINTMENT (OUTPATIENT)
Dept: NEUROLOGY | Facility: CLINIC | Age: 57
End: 2022-05-06
Payer: COMMERCIAL

## 2022-05-06 ENCOUNTER — APPOINTMENT (OUTPATIENT)
Dept: ORTHOPEDIC SURGERY | Facility: CLINIC | Age: 57
End: 2022-05-06
Payer: COMMERCIAL

## 2022-05-06 ENCOUNTER — NON-APPOINTMENT (OUTPATIENT)
Age: 57
End: 2022-05-06

## 2022-05-06 VITALS
SYSTOLIC BLOOD PRESSURE: 140 MMHG | WEIGHT: 183 LBS | HEART RATE: 68 BPM | HEIGHT: 63 IN | BODY MASS INDEX: 32.43 KG/M2 | DIASTOLIC BLOOD PRESSURE: 84 MMHG

## 2022-05-06 VITALS — BODY MASS INDEX: 31.24 KG/M2 | WEIGHT: 183 LBS | HEIGHT: 64 IN

## 2022-05-06 DIAGNOSIS — M54.16 RADICULOPATHY, LUMBAR REGION: ICD-10-CM

## 2022-05-06 DIAGNOSIS — M71.20 SYNOVIAL CYST OF POPLITEAL SPACE [BAKER], UNSPECIFIED KNEE: ICD-10-CM

## 2022-05-06 PROCEDURE — 20611 DRAIN/INJ JOINT/BURSA W/US: CPT | Mod: 59,RT

## 2022-05-06 PROCEDURE — 99204 OFFICE O/P NEW MOD 45 MIN: CPT | Mod: 25

## 2022-05-06 PROCEDURE — 73564 X-RAY EXAM KNEE 4 OR MORE: CPT | Mod: LT

## 2022-05-06 PROCEDURE — 99243 OFF/OP CNSLTJ NEW/EST LOW 30: CPT

## 2022-05-06 NOTE — PHYSICAL EXAM
[FreeTextEntry1] : No cognitive or communication deficits. Cranial nerves intact. No weakness or atrophy of limbs. DTR's active and symmetric except for KJs which are depressed. Plantar responses are flexor. No sensory loss. Gait and coordination intact. He has pain with SLR at 90 degrees bilaterally.

## 2022-05-06 NOTE — CONSULT LETTER
[Dear  ___] : Dear  [unfilled], [Consult Letter:] : I had the pleasure of evaluating your patient, [unfilled]. [Please see my note below.] : Please see my note below. [Consult Closing:] : Thank you very much for allowing me to participate in the care of this patient.  If you have any questions, please do not hesitate to contact me. [Sincerely,] : Sincerely, [FreeTextEntry2] : Dr. Borjas

## 2022-05-09 LAB
25(OH)D3 SERPL-MCNC: 34.3 NG/ML
ALBUMIN SERPL ELPH-MCNC: 4.5 G/DL
ALP BLD-CCNC: 55 U/L
ALT SERPL-CCNC: 15 U/L
ANION GAP SERPL CALC-SCNC: 17 MMOL/L
APPEARANCE: CLEAR
AST SERPL-CCNC: 20 U/L
BACTERIA: NEGATIVE
BASOPHILS # BLD AUTO: 0.05 K/UL
BASOPHILS NFR BLD AUTO: 0.7 %
BILIRUB SERPL-MCNC: 0.5 MG/DL
BILIRUBIN URINE: NEGATIVE
BLOOD URINE: NEGATIVE
BUN SERPL-MCNC: 19 MG/DL
CALCIUM SERPL-MCNC: 9.6 MG/DL
CHLORIDE SERPL-SCNC: 100 MMOL/L
CHOLEST SERPL-MCNC: 204 MG/DL
CO2 SERPL-SCNC: 22 MMOL/L
COLOR: YELLOW
CREAT SERPL-MCNC: 0.62 MG/DL
EGFR: 111 ML/MIN/1.73M2
EOSINOPHIL # BLD AUTO: 0.12 K/UL
EOSINOPHIL NFR BLD AUTO: 1.7 %
ESTIMATED AVERAGE GLUCOSE: 108 MG/DL
GLUCOSE QUALITATIVE U: NEGATIVE
GLUCOSE SERPL-MCNC: 73 MG/DL
HBA1C MFR BLD HPLC: 5.4 %
HCT VFR BLD CALC: 46 %
HDLC SERPL-MCNC: 57 MG/DL
HGB BLD-MCNC: 15.1 G/DL
HYALINE CASTS: 1 /LPF
IMM GRANULOCYTES NFR BLD AUTO: 0.3 %
KETONES URINE: NEGATIVE
LDLC SERPL CALC-MCNC: 136 MG/DL
LEUKOCYTE ESTERASE URINE: NEGATIVE
LYMPHOCYTES # BLD AUTO: 1.39 K/UL
LYMPHOCYTES NFR BLD AUTO: 19.4 %
MAN DIFF?: NORMAL
MCHC RBC-ENTMCNC: 31.5 PG
MCHC RBC-ENTMCNC: 32.8 GM/DL
MCV RBC AUTO: 95.8 FL
MICROSCOPIC-UA: NORMAL
MONOCYTES # BLD AUTO: 0.46 K/UL
MONOCYTES NFR BLD AUTO: 6.4 %
NEUTROPHILS # BLD AUTO: 5.14 K/UL
NEUTROPHILS NFR BLD AUTO: 71.5 %
NITRITE URINE: NEGATIVE
NONHDLC SERPL-MCNC: 147 MG/DL
PH URINE: 6
PLATELET # BLD AUTO: 235 K/UL
POTASSIUM SERPL-SCNC: 3.9 MMOL/L
PROT SERPL-MCNC: 7.3 G/DL
PROTEIN URINE: NEGATIVE
PSA SERPL-MCNC: 0.64 NG/ML
RBC # BLD: 4.8 M/UL
RBC # FLD: 13.8 %
RED BLOOD CELLS URINE: 0 /HPF
SODIUM SERPL-SCNC: 138 MMOL/L
SPECIFIC GRAVITY URINE: 1.02
SQUAMOUS EPITHELIAL CELLS: 0 /HPF
TRIGL SERPL-MCNC: 53 MG/DL
TSH SERPL-ACNC: 1.06 UIU/ML
UROBILINOGEN URINE: NORMAL
VIT B12 SERPL-MCNC: 443 PG/ML
WBC # FLD AUTO: 7.18 K/UL
WHITE BLOOD CELLS URINE: 0 /HPF

## 2022-05-13 PROBLEM — M71.20 POPLITEAL CYST: Status: ACTIVE | Noted: 2022-04-29

## 2022-05-19 ENCOUNTER — OUTPATIENT (OUTPATIENT)
Dept: OUTPATIENT SERVICES | Facility: HOSPITAL | Age: 57
LOS: 1 days | End: 2022-05-19
Payer: COMMERCIAL

## 2022-05-19 ENCOUNTER — APPOINTMENT (OUTPATIENT)
Dept: MRI IMAGING | Facility: CLINIC | Age: 57
End: 2022-05-19
Payer: COMMERCIAL

## 2022-05-19 DIAGNOSIS — Z86.69 PERSONAL HISTORY OF OTHER DISEASES OF THE NERVOUS SYSTEM AND SENSE ORGANS: Chronic | ICD-10-CM

## 2022-05-19 DIAGNOSIS — M54.16 RADICULOPATHY, LUMBAR REGION: ICD-10-CM

## 2022-05-19 DIAGNOSIS — Z96.642 PRESENCE OF LEFT ARTIFICIAL HIP JOINT: Chronic | ICD-10-CM

## 2022-05-19 DIAGNOSIS — Z98.89 OTHER SPECIFIED POSTPROCEDURAL STATES: Chronic | ICD-10-CM

## 2022-05-19 PROCEDURE — 72148 MRI LUMBAR SPINE W/O DYE: CPT

## 2022-05-19 PROCEDURE — 72148 MRI LUMBAR SPINE W/O DYE: CPT | Mod: 26

## 2022-06-07 NOTE — HEALTH RISK ASSESSMENT
[Good] : ~his/her~  mood as  good [Current] : Current [15-19] : 15-19 [Yes] : Yes [Monthly or less (1 pt)] : Monthly or less (1 point) [1 or 2 (0 pts)] : 1 or 2 (0 points) [Never (0 pts)] : Never (0 points) [No falls in past year] : Patient reported no falls in the past year [0] : 2) Feeling down, depressed, or hopeless: Not at all (0) [None] : None [With Family] : lives with family [Employed] : employed [] :  [# Of Children ___] : has [unfilled] children [Sexually Active] : sexually active [de-identified] : Social  [ColonoscopyDate] : 2014

## 2022-06-07 NOTE — PHYSICAL EXAM
[No Acute Distress] : no acute distress [Well Nourished] : well nourished [Normal Sclera/Conjunctiva] : normal sclera/conjunctiva [EOMI] : extraocular movements intact [Normal Outer Ear/Nose] : the outer ears and nose were normal in appearance [Normal Oropharynx] : the oropharynx was normal [No Lymphadenopathy] : no lymphadenopathy [Supple] : supple [Thyroid Normal, No Nodules] : the thyroid was normal and there were no nodules present [No Respiratory Distress] : no respiratory distress  [No Accessory Muscle Use] : no accessory muscle use [Clear to Auscultation] : lungs were clear to auscultation bilaterally [Normal Rate] : normal rate  [Regular Rhythm] : with a regular rhythm [Normal S1, S2] : normal S1 and S2 [No Murmur] : no murmur heard [No Carotid Bruits] : no carotid bruits [No Varicosities] : no varicosities [Pedal Pulses Present] : the pedal pulses are present [No Edema] : there was no peripheral edema [No Extremity Clubbing/Cyanosis] : no extremity clubbing/cyanosis [Soft] : abdomen soft [Non Tender] : non-tender [Non-distended] : non-distended [No Masses] : no abdominal mass palpated [No HSM] : no HSM [Normal Bowel Sounds] : normal bowel sounds [Normal Posterior Cervical Nodes] : no posterior cervical lymphadenopathy [Normal Anterior Cervical Nodes] : no anterior cervical lymphadenopathy [No CVA Tenderness] : no CVA  tenderness [No Spinal Tenderness] : no spinal tenderness [No Joint Swelling] : no joint swelling [Grossly Normal Strength/Tone] : grossly normal strength/tone [No Rash] : no rash [Coordination Grossly Intact] : coordination grossly intact [No Focal Deficits] : no focal deficits [Normal Gait] : normal gait [Normal Affect] : the affect was normal [Normal Insight/Judgement] : insight and judgment were intact [Normal TMs] : both tympanic membranes were normal

## 2022-06-07 NOTE — HISTORY OF PRESENT ILLNESS
[de-identified] : Patient is a 56 year old male with no significant medical history presents for annual  physical \par He report for the last 2 weeks he has been having muscle cramps behind his knees, right shin pain. He reports that he bikes and does the Elliptical twice daily  and bikes daily. He also reports right hip replacement 7 years ago and left hip replacement 12 years ago \par He maintains a healthy diet and exercises regularly \par \par Denies any CP, SOB, HA, N/V or abdominal pain

## 2022-06-07 NOTE — PLAN
[FreeTextEntry1] : Physical \par \par Referral for GI\par \par Paresthesia \par Neurology referral \par \par Hip pain \par Orthopedic referral \par Gentle stretching\par \par Muscle cramps\par Magnesium 400 mg daily \par \par Leg numbness\par \par \par Weight management, Healthy food choices, and increased physical active discussed \par \par Labs ordered pt to follow-up in 1 week for results

## 2022-06-10 ENCOUNTER — APPOINTMENT (OUTPATIENT)
Dept: ORTHOPEDIC SURGERY | Facility: CLINIC | Age: 57
End: 2022-06-10

## 2022-06-10 LAB
CRP SERPL-MCNC: <3 MG/L
ERYTHROCYTE [SEDIMENTATION RATE] IN BLOOD BY WESTERGREN METHOD: 13 MM/HR

## 2022-06-10 PROCEDURE — 20610 DRAIN/INJ JOINT/BURSA W/O US: CPT | Mod: LT

## 2022-06-10 PROCEDURE — 73502 X-RAY EXAM HIP UNI 2-3 VIEWS: CPT | Mod: LT

## 2022-06-10 PROCEDURE — 99214 OFFICE O/P EST MOD 30 MIN: CPT | Mod: 25

## 2022-06-10 NOTE — PHYSICAL EXAM
[de-identified] : Patient is well nourished, well-developed, in no acute distress, with appropriate mood and affect. The patient is oriented to time, place, and person. Respirations are even and unlabored. Gait evaluation does not reveal a limp. There is no inguinal adenopathy. Examination of the contralateral hip shows normal range of motion, strength, no tenderness, and intact skin. The affected limb is well-perfused and showed 2+ dp/pt pulses, without skin lesions, shows a grossly normal motor and sensory examination. Examination of the hip shows a well healed surgical scar. Hip motion is full and painless from 0-90 degrees extension to flexion, 20 degrees adduction and 20 degrees abduction, and 15 degrees internal and 30 degrees external rotation. Leg lengths are approximately equal. Both hips are stable and muscle strength is normal with good strength with resisted abduction and adduction. Pedal pulses are palpable.  Tender to palpation over the lateral aspect of the left hip. [de-identified] : AP pelvis, AP hip, and lateral x-rays of the left hip were ordered and obtained in the office and demonstrate satisfactory position and alignment of the components are present for the left hip resurfacing with there is heterotopic ossification noted around this as well as around the greater trochanter.  These deformities are noted from 2015 and are unchanged compared to old imaging.. No signs of loosening are seen.

## 2022-06-10 NOTE — DISCUSSION/SUMMARY
[de-identified] : This patient has left hip resurfacing.  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  It is possible that this is what is causing the clunking sensation.  He also has left hip trochanteric bursitis.  He also has metal-on-metal components and so like to obtain an ESR, CRP, cobalt and chromium levels to ensure that there is no metallosis happening.  It is also possible that the implant could be loosening although there is no radiographic changes compared to imaging from 7 years prior.  For the left hip trochanteric bursa today we performed left hip trochanteric bursa injection.  If this does not improve his pain over the next 6 weeks we will consider an MRI.\par \par Informed consent for the left hip trochanteric bursa injection was obtained. All questions were answered. A time out was performed. The left lateral hip was prepped and draped in sterile fashion. Using sterile technique, the left greater trochanter was injection with 80mg of Kenalog and 4cc of 0.25% marcaine using a 21-gauge needle. A sterile dressing was applied. Post injection instructions were reviewed. The patient reported relief of symptoms after the injection. The patient tolerated the procedure well.\par

## 2022-06-10 NOTE — HISTORY OF PRESENT ILLNESS
[de-identified] : This is very nice 56-year-old gentleman experiencing left lateral hip pain, which is severe in intensity.  History of a left hip resurfacing by Dr. Gaming 16 years ago and a right total hip arthroplasty by the same surgeon 10 years ago.  He has noted some clicking and clunking in the left hip for the last 6 weeks.  He has never had midlines tested.  Has chronic low back pain.  The pain substantially limits activities of daily living. Walking tolerance is reduced.  Not using a cane or walker.  The patient denies any radiation of the pain to the feet and it is not associated with numbness, tingling, or weakness.

## 2022-06-14 LAB — COBALT: 3.3 UG/L

## 2022-06-16 ENCOUNTER — APPOINTMENT (OUTPATIENT)
Dept: ORTHOPEDIC SURGERY | Facility: CLINIC | Age: 57
End: 2022-06-16
Payer: COMMERCIAL

## 2022-06-16 DIAGNOSIS — Z96.642 PRESENCE OF LEFT ARTIFICIAL HIP JOINT: ICD-10-CM

## 2022-06-16 LAB — CR BLD-MCNC: 5.4 UG/L

## 2022-06-16 PROCEDURE — 99441: CPT

## 2022-08-25 ENCOUNTER — APPOINTMENT (OUTPATIENT)
Dept: NEUROLOGY | Facility: CLINIC | Age: 57
End: 2022-08-25

## 2023-07-12 NOTE — ED CDU PROVIDER INITIAL DAY NOTE - CARDIOVASCULAR [+], MLM
Detail Level: Detailed
Size Of Lesion In Cm (Optional): 0
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
Morphology Per Location (Optional): SCCis treated with EDC by Dr. Rivera on 7/8/2020
Intermittent CP

## 2025-01-10 NOTE — REVIEW OF SYSTEMS
[Joint Pain] : joint pain [Joint Stiffness] : joint stiffness [Negative] : Heme/Lymph What Type Of Note Output Would You Prefer (Optional)?: Bullet Format Is The Patient Presenting As Previously Scheduled?: Yes How Severe Is Your Rash?: moderate Is This A New Presentation, Or A Follow-Up?: Rash Additional History: Patient reports “crazy itchy skin” that keeps him up at night. Patient denies any evidence of a rash on his skin, only extreme itchiness.